# Patient Record
Sex: MALE | Race: WHITE | Employment: OTHER | URBAN - METROPOLITAN AREA
[De-identification: names, ages, dates, MRNs, and addresses within clinical notes are randomized per-mention and may not be internally consistent; named-entity substitution may affect disease eponyms.]

---

## 2021-06-15 ENCOUNTER — HOSPITAL ENCOUNTER (OUTPATIENT)
Dept: RADIATION THERAPY | Age: 66
Discharge: HOME | End: 2021-06-15

## 2021-06-15 RX ORDER — FINASTERIDE 5 MG/1
5 TABLET, FILM COATED ORAL DAILY
COMMUNITY

## 2021-06-15 RX ORDER — TAMSULOSIN HYDROCHLORIDE 0.4 MG/1
0.4 CAPSULE ORAL DAILY
COMMUNITY

## 2021-06-15 RX ORDER — ROSUVASTATIN CALCIUM 10 MG/1
10 TABLET, COATED ORAL
COMMUNITY

## 2021-06-15 RX ORDER — PHENOL/SODIUM PHENOLATE
20 AEROSOL, SPRAY (ML) MUCOUS MEMBRANE DAILY
COMMUNITY

## 2021-11-15 ENCOUNTER — HOSPITAL ENCOUNTER (OUTPATIENT)
Dept: RADIATION THERAPY | Age: 66
Discharge: HOME | End: 2021-11-15

## 2022-05-10 ENCOUNTER — HOSPITAL ENCOUNTER (OUTPATIENT)
Dept: RADIATION THERAPY | Age: 67
Discharge: HOME | End: 2022-05-10

## 2022-05-10 VITALS — HEIGHT: 70 IN | BODY MASS INDEX: 25.05 KG/M2 | WEIGHT: 175 LBS

## 2023-02-14 ENCOUNTER — HOSPITAL ENCOUNTER (OUTPATIENT)
Dept: RADIATION THERAPY | Age: 68
Discharge: HOME | End: 2023-02-14

## 2023-10-24 PROBLEM — C61 MALIGNANT NEOPLASM OF PROSTATE (HCC): Status: ACTIVE | Noted: 2023-10-24

## 2023-10-24 NOTE — PROGRESS NOTES
[facility] [place] [facility]  [facility]      RADIATION ONCOLOGY FOLLOW-UP VISIT NOTE     Encounter Date: 10/25/23   Patient Name: Lucinda Limon  YOB: 1955  Medical Record Number: [id number]  Referring Physician: Haja Soto MD  [address] [place],  [address]  Primary Care Provider: Cassi Rice MD    DIAGNOSIS:      ICD-10-CM    1. Malignant neoplasm of prostate ([address])  C61         STAGING:  Cancer Staging   Malignant neoplasm of prostate [facility])  Staging form: Prostate, AJCC 8th Edition  - Clinical stage from 5/20/2021: Stage IIC (cT2a, cN0, cM0, PSA: 11.6, Grade Group: 3) - Signed by Win Mortensen MD on 10/24/2023  AJCC Staging has been reviewed      [address] [facility],[address]:  26 months (completed IMRT 8/11/21)      ASSESSMENT:  77 yo M with unfavorable intermediate risk prostate cancer, cT2a, iPSA 5.81 (11.62 on finasteride), [place]int[place] 4+3=7 in 5 cores including the targeted lesion (6/13 total cores involved) treated with definitive IMRT (6000cGy in 20fx completed 8/11/21) + 4 months ADT (final injection 7/30/21). PROSTATE CANCER:  [place] 4+3 disease, iPSA 11.6 corrected, diagnosed with TRUS on 5/20/21. MRI pelvis 3/4/21 found a PIRADS-4 lesion in the left apex PZ. He elected treatment with definitive IMRT + 4 mos ADT completed 8/2021. Final ADT given 7/30/21. Initial post IMRT PSA 0.007 which it then came up with testosterone recovery (0.2 in 4/2022) as expected. Trended down to 0.143; now up slightly to 0.235 but still JOSE ALEJANDRO and no need for additional therapy. Continue PSA surveillance q6 months.  SYMPTOMS:  stable with only mild symptoms. Continue 0.4mg flomax at night. Continue fluid restriction in the evenings and limit caffeine intake late in the day to decrease nocturia. GI SYMPTOMS:  rarely with small volume hematochezia. Discussed possible etiologies, most likely hemorrhoid.   If radiation

## 2023-10-25 ENCOUNTER — HOSPITAL ENCOUNTER (OUTPATIENT)
Facility: HOSPITAL | Age: 68
Discharge: HOME | End: 2023-10-28

## 2023-10-25 VITALS
OXYGEN SATURATION: 97 % | HEIGHT: 70 IN | HEART RATE: 76 BPM | DIASTOLIC BLOOD PRESSURE: 79 MMHG | WEIGHT: 187 LBS | BODY MASS INDEX: 26.77 KG/M2 | RESPIRATION RATE: 16 BRPM | SYSTOLIC BLOOD PRESSURE: 117 MMHG

## 2023-10-25 DIAGNOSIS — C61 MALIGNANT NEOPLASM OF PROSTATE (HCC): ICD-10-CM

## 2023-10-25 ASSESSMENT — PAIN SCALES - GENERAL: PAINLEVEL_OUTOF10: 0

## 2024-10-24 ENCOUNTER — HOSPITAL ENCOUNTER (OUTPATIENT)
Facility: HOSPITAL | Age: 69
End: 2024-10-24

## 2024-10-24 VITALS
DIASTOLIC BLOOD PRESSURE: 85 MMHG | HEART RATE: 60 BPM | SYSTOLIC BLOOD PRESSURE: 125 MMHG | BODY MASS INDEX: 27.4 KG/M2 | RESPIRATION RATE: 16 BRPM | HEIGHT: 70 IN | WEIGHT: 191.4 LBS | OXYGEN SATURATION: 97 %

## 2024-10-24 DIAGNOSIS — C61 MALIGNANT NEOPLASM OF PROSTATE (HCC): Primary | ICD-10-CM

## 2024-10-24 ASSESSMENT — PAIN SCALES - GENERAL: PAINLEVEL_OUTOF10: 0

## 2024-10-24 NOTE — PROGRESS NOTES
[facility] [place] [facility]  [facility]      RADIATION ONCOLOGY FOLLOW-UP VISIT NOTE     Encounter Date: 10/24/24   Patient Name: Phil Petit  YOB: 1955  Medical Record Number: [id number]  Referring Physician: Marcos Nicole MD  [address] [place] [address]  Primary Care Provider: Yoel Gómez MD      DIAGNOSIS:       ICD-10-CM    1. Malignant neoplasm of prostate ([facility])  C61         STAGING:    Cancer Staging   Malignant neoplasm of prostate ([facility])  Staging form: Prostate, AJCC 8th Edition  - Clinical stage from 5/20/2021: Stage IIC (cT2a, cN0, cM0, PSA: 11.6, Grade Group: 3) - Signed by Kalyan Zuñiga MD on 10/24/2023  AJCC Staging has been reviewed      ASSESSMENT:   70 yo M with unfavorable intermediate risk prostate cancer, cT2a, iPSA 5.81 (11.62 on finasteride), [place] 4+3=7 in 5 cores including the targeted lesion (6/13 total cores involved) treated with definitive IMRT (6000cGy in 20fx completed 8/11/21) + 4 months ADT (final injection 7/30/21).       PROSTATE CANCER:  [place] 4+3 disease, iPSA 11.6 corrected, diagnosed with TRUS on 5/20/21.  MRI pelvis 3/4/21 found a PIRADS-4 lesion in the left apex PZ.  He elected treatment with definitive IMRT + 4 mos ADT completed 8/2021.  Final ADT given 7/30/21.  Initial post IMRT PSA 0.007 which it then came up with testosterone recovery (0.2 in 4/2022) as expected.  Now remains low and stable at 0.185 and thus JOSE ALEJANDRO.  No need for additional therapy. Continue PSA surveillance q6 months.    SYMPTOMS:  stable with only mild symptoms.  Continue 0.4mg flomax at night.  CPAP has helped as well.  GI SYMPTOMS:  stable, with rare, small volume hematochezia. Discussed possible etiologies, most likely hemorrhoid.  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 Will be due for colonoscopy in the next 1-2 yrs as well.  SEXUAL HEALTH:  decreased post radiation; now remains stable and low.